# Patient Record
Sex: FEMALE | Race: WHITE | NOT HISPANIC OR LATINO | URBAN - METROPOLITAN AREA
[De-identification: names, ages, dates, MRNs, and addresses within clinical notes are randomized per-mention and may not be internally consistent; named-entity substitution may affect disease eponyms.]

---

## 2023-10-11 ENCOUNTER — APPOINTMENT (RX ONLY)
Dept: URBAN - METROPOLITAN AREA CLINIC 33 | Facility: CLINIC | Age: 68
Setting detail: DERMATOLOGY
End: 2023-10-11

## 2023-10-11 DIAGNOSIS — L82.0 INFLAMED SEBORRHEIC KERATOSIS: ICD-10-CM

## 2023-10-11 DIAGNOSIS — L82.1 OTHER SEBORRHEIC KERATOSIS: ICD-10-CM

## 2023-10-11 PROCEDURE — 17110 DESTRUCTION B9 LES UP TO 14: CPT

## 2023-10-11 PROCEDURE — ? LIQUID NITROGEN

## 2023-10-11 PROCEDURE — ? COUNSELING

## 2023-10-11 PROCEDURE — ? ADDITIONAL NOTES

## 2023-10-11 PROCEDURE — 99202 OFFICE O/P NEW SF 15 MIN: CPT | Mod: 25

## 2023-10-11 ASSESSMENT — LOCATION ZONE DERM
LOCATION ZONE: FACE
LOCATION ZONE: EYELID

## 2023-10-11 ASSESSMENT — LOCATION DETAILED DESCRIPTION DERM
LOCATION DETAILED: LEFT MEDIAL FOREHEAD
LOCATION DETAILED: LEFT LATERAL SUPERIOR EYELID

## 2023-10-11 ASSESSMENT — LOCATION SIMPLE DESCRIPTION DERM
LOCATION SIMPLE: LEFT FOREHEAD
LOCATION SIMPLE: LEFT SUPERIOR EYELID

## 2023-10-11 NOTE — PROCEDURE: ADDITIONAL NOTES
Additional Notes: Discussed with patient to apply Vaseline or Aquaphor for the healing process. \\nAdvised patient may need to retreat that area at next visit. \\n\\nRTC in 4 weeks.
Render Risk Assessment In Note?: no
Detail Level: Simple

## 2023-10-11 NOTE — PROCEDURE: LIQUID NITROGEN
Number Of Freeze-Thaw Cycles: 2 freeze-thaw cycles
Render Post-Care Instructions In Note?: no
Application Tool (Optional): Liquid Nitrogen Sprayer
Show Topical Anesthesia Variable?: Yes
Medical Necessity Information: It is in your best interest to select a reason for this procedure from the list below. All of these items fulfill various CMS LCD requirements except the new and changing color options.
Medical Necessity Clause: This procedure was medically necessary because the lesions that were treated were:
Post-Care Instructions: I reviewed with the patient in detail post-care instructions. Patient is to wear sunprotection, and avoid picking at any of the treated lesions. Pt may apply Vaseline to crusted or scabbing areas.
Duration Of Freeze Thaw-Cycle (Seconds): 5-10
Detail Level: Detailed
Consent: The patient's consent was obtained including but not limited to risks of crusting, scabbing, blistering, scarring, darker or lighter pigmentary change, recurrence, incomplete removal and infection.
Spray Paint Text: The liquid nitrogen was applied to the skin utilizing a spray paint frosting technique.

## 2023-10-11 NOTE — HPI: SKIN LESION
What Type Of Note Output Would You Prefer (Optional)?: Standard Output
How Severe Is Your Skin Lesion?: moderate
Has Your Skin Lesion Been Treated?: not been treated
Is This A New Presentation, Or A Follow-Up?: Skin Lesion
Which Family Member (Optional)?: Father and mother
Additional History: Patient is being seen today for a lesion above her left eye. Patient states this lesion has been here for 5-6 years, also states it is very itch and does scratch at the area.

## 2025-01-16 ENCOUNTER — DASHBOARD ENCOUNTERS (OUTPATIENT)
Age: 70
End: 2025-01-16

## 2025-01-16 ENCOUNTER — OFFICE VISIT (OUTPATIENT)
Dept: URBAN - METROPOLITAN AREA CLINIC 105 | Facility: CLINIC | Age: 70
End: 2025-01-16
Payer: COMMERCIAL

## 2025-01-16 VITALS
HEART RATE: 112 BPM | WEIGHT: 212 LBS | HEIGHT: 67 IN | SYSTOLIC BLOOD PRESSURE: 90 MMHG | TEMPERATURE: 97.3 F | DIASTOLIC BLOOD PRESSURE: 70 MMHG | BODY MASS INDEX: 33.27 KG/M2

## 2025-01-16 DIAGNOSIS — E86.0 DEHYDRATION: ICD-10-CM

## 2025-01-16 DIAGNOSIS — R00.0 TACHYCARDIA: ICD-10-CM

## 2025-01-16 DIAGNOSIS — N18.30 STAGE 3 CHRONIC KIDNEY DISEASE, UNSPECIFIED WHETHER STAGE 3A OR 3B CKD: ICD-10-CM

## 2025-01-16 DIAGNOSIS — I95.9 SYMPTOMATIC HYPOTENSION: ICD-10-CM

## 2025-01-16 DIAGNOSIS — R10.33 PERIUMBILICAL ABDOMINAL PAIN: ICD-10-CM

## 2025-01-16 DIAGNOSIS — Z86.19 HISTORY OF CLOSTRIDIUM DIFFICILE INFECTION: ICD-10-CM

## 2025-01-16 DIAGNOSIS — R19.7 INTERMITTENT DIARRHEA: ICD-10-CM

## 2025-01-16 PROBLEM — 433144002: Status: ACTIVE | Noted: 2025-01-16

## 2025-01-16 PROCEDURE — 99204 OFFICE O/P NEW MOD 45 MIN: CPT | Performed by: INTERNAL MEDICINE

## 2025-01-16 NOTE — PHYSICAL EXAM GASTROINTESTINAL
Abdomen soft, tender in periumbilical region, nondistended, no guarding or rigidity, no masses palpable, normal bowel sounds Liver and Spleen no hepatosplenomegaly Rectal deferred

## 2025-01-16 NOTE — PHYSICAL EXAM CONSTITUTIONAL:
in no acute distress, ambulating with walker, normal communication ability, appearing weak and fatigued

## 2025-01-16 NOTE — HPI-TODAY'S VISIT:
69-year-old female with PMH of HTN, HLD, DVT on Eliquis (recently switched from Coumadin), asthma, stage 3 CKD, and anxiety/depression, presenting to discuss diarrhea. She was hospitalized 12/10/24-12/14/24 for diarrhea. She was treated for C diff at [facility] the month prior. She was again positive for C diff and started on Dificid. She was also found to have UTI and tx with Rocephin and fosfomycin.  CT A/P without contrast showed air-fluid levels in colon and mild diffuse colonic wall thickening extending into recum, previous sleeve gastrectomy with sliding hiatal hernia, stable scattered hypodensities in liver c/w small cysts or biliary hamartomas, s/p cholecystectomy, nonobstructing 0.8 cm R renal calculus, fat-containing umbilical hernia, fat stranding in abdominal wall likely post-operative in nature related to recent gastric sleeve. She had mild transaminitis during hospitalization. RUQ US showed hepatic steatosis, 1.2 cm simple cyst (no f/u required), s/p cholecystectomy, mild renal cortical atrophy.   Today, pt states she had gastric sleeve at [facility] in 10/2024. She had been doing well after surgery, but then a couple of weeks later developed central abdominal pain described as twisting and burning. She went to [facility] ER 11/6/24 and had CT scan showing multiple fluid-filled small bowel loops up to 3 cm in diameter proximal to knuckle of distal ileum protruding through R-sided spigelian hernia in R lower abdomen with decompression of exiting ileal loops, numerous hepatic cysts (no need for f/u), bilateral renal atrophy, 0.6 cm renal calculus, tiny left renal cysts (no need for f/u), moderately large colonic feca residual, postoperative changes of gastric sleeve surgery without complication. She had another surgery to repair a hernia. She was supposed to be discharged a couple of days later but then developed C diff. She was in hospital until 11/13/24.  States she did well after discharge until 12/1/24 when diarrhea started again. She was recommended to go to ED. She was treated for C diff again. She was having hematuria and PT/INR was high. States from 1/2/25-1/4/25 had diarrhea again; stopped; then started again 1/12/25. Starts suddenly so she has to wear Depends when she leaves the house. States the consistency is loose and sometimes mucus-y.  told her he saw blood but she saw bright red blood only once, small amount with wiping. States her abdomen "doesn't feel good" but not significantly painful. She has had nausea but no vomiting. Admits to chills once, but no measured fever.  States she has been eating very bland foods. She does not eat dairy d/t lactose intolerance. She went to liquids only a couple of days ago. She had gotten better, but then she tried to eat a small amount of crackers and diarrhea started again this morning. States she saw PCP via telehealth on Monday. They ordered labs and stool studies but she has not been able to do them. She knows she is dehydrated. States she is trying her best to stay hydrated but is concerned about her kidneys. She feels very weak. She experienced dizziness when standing up to go to bathroom last week and had to call 911 to help her get up; states she declined going to the hospital. She is concerned about safety driving. She has a walker because she is recovering from a broken ankle.   Labs 12/14/24 - Glucose 111, chloride 110, Cr 1.2 with GFR 48, BMP otherwise normal.  12/13/24 - Chloride 114, Cr 1.18 with GFR 49, calcium 8.1, BMP otherwise normal. CBC normal.  12/12/24 - Potassium 3.4, chloride 109, glucose 108, BUN 22, Cr 1.24 with GFR 47, BMP otherwise normal. CBC normal. 12/11/24 - GI PCR positive for C diff toxin. Hepatitis panel negative. A1c 5.6%. 12/10/24 - Sodium 135, BUN 30, Cr 1.84 with GFR 29, ALT 63, AST 92, alk phos 297, CMP otherwise normal. RDW 15.6, CBC otherwise normal.

## 2025-01-16 NOTE — PHYSICAL EXAM HENT:
Head normocephalic,  atraumatic,  Face Face within normal limits,  Ears External ears within normal limits,  Nose/Nasopharynx External nose  normal appearance, no nasal discharge, Lips Appearance normal, Mouth mildly dry mucous membranes

## 2025-01-17 ENCOUNTER — CLAIMS CREATED FROM THE CLAIM WINDOW (OUTPATIENT)
Dept: URBAN - METROPOLITAN AREA MEDICAL CENTER 33 | Facility: MEDICAL CENTER | Age: 70
End: 2025-01-17
Payer: COMMERCIAL

## 2025-01-17 DIAGNOSIS — Z79.01 ADEQUATE ANTICOAGULATION ON ANTICOAGULANT THERAPY: ICD-10-CM

## 2025-01-17 DIAGNOSIS — A04.71 CLOSTRIDIUM DIFFICILE COLITIS: ICD-10-CM

## 2025-01-17 DIAGNOSIS — R93.3 ABN FINDINGS-GI TRACT: ICD-10-CM

## 2025-01-17 PROCEDURE — 99221 1ST HOSP IP/OBS SF/LOW 40: CPT | Performed by: INTERNAL MEDICINE

## 2025-01-17 PROCEDURE — G8427 DOCREV CUR MEDS BY ELIG CLIN: HCPCS | Performed by: INTERNAL MEDICINE

## 2025-01-17 PROCEDURE — 99253 IP/OBS CNSLTJ NEW/EST LOW 45: CPT | Performed by: INTERNAL MEDICINE

## 2025-01-30 ENCOUNTER — OFFICE VISIT (OUTPATIENT)
Dept: URBAN - METROPOLITAN AREA CLINIC 105 | Facility: CLINIC | Age: 70
End: 2025-01-30

## 2025-02-04 ENCOUNTER — OFFICE VISIT (OUTPATIENT)
Dept: URBAN - METROPOLITAN AREA CLINIC 105 | Facility: CLINIC | Age: 70
End: 2025-02-04